# Patient Record
Sex: MALE | Race: WHITE | NOT HISPANIC OR LATINO | Employment: FULL TIME | URBAN - METROPOLITAN AREA
[De-identification: names, ages, dates, MRNs, and addresses within clinical notes are randomized per-mention and may not be internally consistent; named-entity substitution may affect disease eponyms.]

---

## 2022-02-15 ENCOUNTER — NURSE TRIAGE (OUTPATIENT)
Dept: NURSING | Facility: CLINIC | Age: 37
End: 2022-02-15
Payer: COMMERCIAL

## 2022-02-15 ENCOUNTER — OFFICE VISIT (OUTPATIENT)
Dept: FAMILY MEDICINE | Facility: CLINIC | Age: 37
End: 2022-02-15
Payer: COMMERCIAL

## 2022-02-15 VITALS
OXYGEN SATURATION: 98 % | TEMPERATURE: 97.3 F | WEIGHT: 157 LBS | DIASTOLIC BLOOD PRESSURE: 70 MMHG | HEART RATE: 56 BPM | SYSTOLIC BLOOD PRESSURE: 104 MMHG | BODY MASS INDEX: 23.25 KG/M2 | HEIGHT: 69 IN

## 2022-02-15 DIAGNOSIS — L08.9 LOCAL INFECTION OF SKIN AND SUBCUTANEOUS TISSUE: Primary | ICD-10-CM

## 2022-02-15 PROCEDURE — 99204 OFFICE O/P NEW MOD 45 MIN: CPT | Performed by: FAMILY MEDICINE

## 2022-02-15 RX ORDER — SULFAMETHOXAZOLE/TRIMETHOPRIM 800-160 MG
1 TABLET ORAL 2 TIMES DAILY
Qty: 20 TABLET | Refills: 0 | Status: SHIPPED | OUTPATIENT
Start: 2022-02-15 | End: 2022-02-25

## 2022-02-15 ASSESSMENT — MIFFLIN-ST. JEOR: SCORE: 1636.49

## 2022-02-15 ASSESSMENT — PAIN SCALES - GENERAL: PAINLEVEL: MILD PAIN (2)

## 2022-02-15 NOTE — PROGRESS NOTES
Assessment & Plan     Local infection of skin and subcutaneous tissue  - unclear if this is localized infection or 2/2 infected cyst   - advised below abx to treat infection and to follow up to determine if there is cyst or area was indurated  - if there is underlying cyst then will discuss referral to surgeon for excision   - advised below  Ibuprofen 400 to 600 mg every 8 hours as needed for pain, please take with food  Ice two to three times daily for 15 minutes  Bactrim one tablet every 12 hours for 10 days  Follow up in ten days to ensure area has healed. Follow up sooner in 2-3 days if you are experiencing worsening pain, fever, chills, drainage or other concerns.   - sulfamethoxazole-trimethoprim (BACTRIM DS) 800-160 MG tablet; Take 1 tablet by mouth 2 times daily for 10 days  Dispense: 20 tablet; Refill: 0          Return in about 10 days (around 2/25/2022) for Routine Visit, sympotms are not improving.    Kimberly Clarke MD  [facility]    Lindy Sahu is a 36 year old who presents for the following health issues     History of Present Illness       He eats 2-3 servings of fruits and vegetables daily.He consumes 1 sweetened beverage(s) daily.He exercises with enough effort to increase his heart rate 60 or more minutes per day.  He exercises with enough effort to increase his heart rate 5 days per week.   He is taking medications regularly.       Four to five days ago he noticed a cyst in his belly button. It is getting more swollen and getting bigger everyday. He notes some mild pain and pressure, area is tender. No drainage. He initially thought it was ingrown hair and was picking at it. No fever or chills. No previous symptoms.         Review of Systems         Objective    There were no vitals taken for this visit.  There is no height or weight on file to calculate BMI.  Physical Exam   SKIN: slightly superior to umbilical region is indurated erythematous area with  small scab, no drainage

## 2022-02-15 NOTE — PATIENT INSTRUCTIONS
Ibuprofen 400 to 600 mg every 8 hours as needed for pain, please take with food  Ice two to three times daily for 15 minutes  Bactrim one tablet every 12 hours for 10 days  Follow up in ten days to ensure area has healed. Follow up sooner in 2-3 days if you are experiencing worsening pain, fever, chills, drainage or other concerns.

## 2022-02-15 NOTE — TELEPHONE ENCOUNTER
Triage call    Patient calling because he has a cyst  About the size of a pea near his belly button.  He thought it was a ingrown hair but he has gotten red and painful when sitting.    Per protocol be seen today or tomorrow.  Care advice given.  Verbalizes understanding and agrees with plan.  Transferred to scheduling     Omaira Andres RN   [facility] Nurse Advisor  8:55 AM 2/15/2022    COVID 19 Nurse Triage Plan/Patient Instructions    Please be aware that novel coronavirus (COVID-19) may be circulating in the community. If you develop symptoms such as fever, cough, or SOB or if you have concerns about the presence of another infection including coronavirus (COVID-19), please contact your health care provider or visit [email or web address].     Disposition/Instructions    In-Person Visit with provider recommended. Reference Visit Selection Guide.    Thank you for taking steps to prevent the spread of this virus.  o Limit your contact with others.  o Wear a simple mask to cover your cough.  o Wash your hands well and often.    Resources    [facility]: About COVID-19: [email or web address]    CDC: What to Do If You're Sick: www.cdc.gov/coronavirus/2019-ncov/about/steps-when-sick.html    CDC: Ending Home Isolation: www.cdc.gov/coronavirus/2019-ncov/hcp/disposition-in-home-patients.html     CDC: Caring for Someone: www.cdc.gov/coronavirus/2019-ncov/if-you-are-sick/care-for-someone.html     [facility]: Interim Guidance for Hospital Discharge to Home: www.health.[facility].[place]/diseases/coronavirus/hcp/hospdischarge.pdf    [facility] clinical trials (COVID-19 research studies): [email or web address]     Below are the COVID-19 hotlines at the [place] Department of [facility] ([facility]). Interpreters are available.   o For health questions: Call 254-757-7346 or 1-246.647.8720 (7 a.m. to 7 p.m.)  o For questions about schools and childcare: Call 108-754-5803 or  4-342-107-9755 (7 a.m. to 7 p.m.)    Reason for Disposition    Patient wants to be seen    Additional Information    Negative: Widespread rash and bright red, sunburn-like and too weak to stand    Negative: Sounds like a life-threatening emergency to the triager    Negative: Painful lump or swelling at opening to anus (rectum)    Negative: Painful lump or swelling at opening to vagina (on labia)    Negative: Painful lump or swelling on scrotum    Negative: Doesn't match the SYMPTOMS of a boil    Negative: Widespread red rash    Negative: Black (necrotic) color or blisters develop in wound    Negative: Patient sounds very sick or weak to the triager    Negative: Boil > 2 inches across (> 5 cm; larger than a golf ball or ping pong ball)    Negative: Boil > 1/2 inch across (> 12 mm; larger than a marble) and center is soft or pus colored    Negative: Spreading redness around the boil and no fever    Negative: Boil and diabetes mellitus or weak immune system (e.g., HIV positive, cancer chemo, splenectomy, organ transplant, chronic steroids)    Negative: SEVERE pain (e.g., excruciating)    Negative: Red streak from area of infection    Negative: Fever > 100.4 F (38.0 C)    Negative: Boil > 1/4 inch across (> 6 mm; larger than a pencil eraser) and on face    Negative: Boil overlying a joint    Negative: 2 or more boils    Protocols used: BOIL (SKIN ABSCESS)-A-OH